# Patient Record
Sex: FEMALE | Race: WHITE | Employment: OTHER | URBAN - METROPOLITAN AREA
[De-identification: names, ages, dates, MRNs, and addresses within clinical notes are randomized per-mention and may not be internally consistent; named-entity substitution may affect disease eponyms.]

---

## 2021-12-06 ENCOUNTER — APPOINTMENT (OUTPATIENT)
Dept: GENERAL RADIOLOGY | Age: 56
End: 2021-12-06
Payer: COMMERCIAL

## 2021-12-06 ENCOUNTER — HOSPITAL ENCOUNTER (EMERGENCY)
Age: 56
Discharge: HOME | End: 2021-12-07
Attending: EMERGENCY MEDICINE
Payer: COMMERCIAL

## 2021-12-06 DIAGNOSIS — G40.909 SEIZURE DISORDER (HCC): Primary | ICD-10-CM

## 2021-12-06 LAB
ALBUMIN SERPL-MCNC: 4.4 GM/DL (ref 3.4–5)
ALP BLD-CCNC: 90 IU/L (ref 40–129)
ALT SERPL-CCNC: 13 U/L (ref 10–40)
ANION GAP SERPL CALCULATED.3IONS-SCNC: 15 MMOL/L (ref 4–16)
AST SERPL-CCNC: 12 IU/L (ref 15–37)
BASOPHILS ABSOLUTE: 0 K/CU MM
BASOPHILS RELATIVE PERCENT: 0.5 % (ref 0–1)
BILIRUB SERPL-MCNC: 0.2 MG/DL (ref 0–1)
BUN BLDV-MCNC: 12 MG/DL (ref 6–23)
CALCIUM SERPL-MCNC: 10.3 MG/DL (ref 8.3–10.6)
CHLORIDE BLD-SCNC: 98 MMOL/L (ref 99–110)
CO2: 25 MMOL/L (ref 21–32)
CREAT SERPL-MCNC: 0.3 MG/DL (ref 0.6–1.1)
DIFFERENTIAL TYPE: ABNORMAL
EOSINOPHILS ABSOLUTE: 0.1 K/CU MM
EOSINOPHILS RELATIVE PERCENT: 1.3 % (ref 0–3)
GFR AFRICAN AMERICAN: >60 ML/MIN/1.73M2
GFR NON-AFRICAN AMERICAN: >60 ML/MIN/1.73M2
GLUCOSE BLD-MCNC: 101 MG/DL (ref 70–99)
HCT VFR BLD CALC: 45.4 % (ref 37–47)
HEMOGLOBIN: 14.3 GM/DL (ref 12.5–16)
IMMATURE NEUTROPHIL %: 0.1 % (ref 0–0.43)
LACTIC ACID, SEPSIS: 0.7 MMOL/L (ref 0.5–1.9)
LYMPHOCYTES ABSOLUTE: 2 K/CU MM
LYMPHOCYTES RELATIVE PERCENT: 25 % (ref 24–44)
MCH RBC QN AUTO: 28.7 PG (ref 27–31)
MCHC RBC AUTO-ENTMCNC: 31.5 % (ref 32–36)
MCV RBC AUTO: 91.2 FL (ref 78–100)
MONOCYTES ABSOLUTE: 0.3 K/CU MM
MONOCYTES RELATIVE PERCENT: 3.7 % (ref 0–4)
PDW BLD-RTO: 15.9 % (ref 11.7–14.9)
PLATELET # BLD: 294 K/CU MM (ref 140–440)
PMV BLD AUTO: 10.4 FL (ref 7.5–11.1)
POTASSIUM SERPL-SCNC: 4.2 MMOL/L (ref 3.5–5.1)
RBC # BLD: 4.98 M/CU MM (ref 4.2–5.4)
SEGMENTED NEUTROPHILS ABSOLUTE COUNT: 5.4 K/CU MM
SEGMENTED NEUTROPHILS RELATIVE PERCENT: 69.4 % (ref 36–66)
SODIUM BLD-SCNC: 138 MMOL/L (ref 135–145)
TOTAL IMMATURE NEUTOROPHIL: 0.01 K/CU MM
TOTAL PROTEIN: 8.6 GM/DL (ref 6.4–8.2)
WBC # BLD: 7.8 K/CU MM (ref 4–10.5)

## 2021-12-06 PROCEDURE — 85025 COMPLETE CBC W/AUTO DIFF WBC: CPT

## 2021-12-06 PROCEDURE — 87040 BLOOD CULTURE FOR BACTERIA: CPT

## 2021-12-06 PROCEDURE — 2580000003 HC RX 258: Performed by: EMERGENCY MEDICINE

## 2021-12-06 PROCEDURE — 96375 TX/PRO/DX INJ NEW DRUG ADDON: CPT

## 2021-12-06 PROCEDURE — 71045 X-RAY EXAM CHEST 1 VIEW: CPT

## 2021-12-06 PROCEDURE — 6360000002 HC RX W HCPCS: Performed by: EMERGENCY MEDICINE

## 2021-12-06 PROCEDURE — 87077 CULTURE AEROBIC IDENTIFY: CPT

## 2021-12-06 PROCEDURE — 87150 DNA/RNA AMPLIFIED PROBE: CPT

## 2021-12-06 PROCEDURE — 99284 EMERGENCY DEPT VISIT MOD MDM: CPT

## 2021-12-06 PROCEDURE — 71046 X-RAY EXAM CHEST 2 VIEWS: CPT

## 2021-12-06 PROCEDURE — 96365 THER/PROPH/DIAG IV INF INIT: CPT

## 2021-12-06 PROCEDURE — 83605 ASSAY OF LACTIC ACID: CPT

## 2021-12-06 PROCEDURE — 80053 COMPREHEN METABOLIC PANEL: CPT

## 2021-12-06 RX ORDER — LORAZEPAM 2 MG/ML
2 INJECTION INTRAMUSCULAR ONCE
Status: COMPLETED | OUTPATIENT
Start: 2021-12-06 | End: 2021-12-06

## 2021-12-06 RX ORDER — LEVETIRACETAM 100 MG/ML
10 SOLUTION ORAL 2 TIMES DAILY
COMMUNITY

## 2021-12-06 RX ADMIN — LEVETIRACETAM 1000 MG: 100 INJECTION, SOLUTION INTRAVENOUS at 18:50

## 2021-12-06 RX ADMIN — LORAZEPAM 2 MG: 2 INJECTION INTRAMUSCULAR; INTRAVENOUS at 18:28

## 2021-12-06 NOTE — ED TRIAGE NOTES
Patient arrives to ER via EMS with complaints of Seizure. Patient had witness seizure at facility. Patient given 2mg ativan via G tube.

## 2021-12-07 VITALS
RESPIRATION RATE: 15 BRPM | OXYGEN SATURATION: 98 % | HEART RATE: 97 BPM | BODY MASS INDEX: 29.73 KG/M2 | DIASTOLIC BLOOD PRESSURE: 89 MMHG | WEIGHT: 185 LBS | SYSTOLIC BLOOD PRESSURE: 155 MMHG | HEIGHT: 66 IN | TEMPERATURE: 97.8 F

## 2021-12-07 NOTE — ED PROVIDER NOTES
Triage Chief Complaint:   Seizures      [place]:  Zay Mireles is a 49 y.o. female that presents to the emergency department with a seizure. Patient is brought in by EMS from a nearby nursing home. Per report patient is nonambulatory and nonverbal at baseline. She has a history of seizures and takes Keppra, Ativan and Vimpat. Per report staff noticed seizure activity [phone]. They gave 2mg of oral ativan through the PEG tube. EMS then transported the patient here. Patient has Twitching of her face, tongue and eyes. .    Past Medical History:   Diagnosis Date    Acute and chronic respiratory failure (acute-on-chronic) (HCC)     B12 deficiency     Cataract     Degenerative disease of nervous system ([facility]     Dementia (HCC)     Dysphagia     Encephalopathy     Epilepsy, unspecified, intractable, without status epilepticus ([facility]     H/O quadriplegia     Hepatitis B     Hyperlipidemia     Ileus ([facility]     Osteoporosis     Seizure ([facility]      History reviewed. No pertinent surgical history. History reviewed. No pertinent family history. Social History     Socioeconomic History    Marital status: Not on file     Spouse name: Not on file    Number of children: Not on file    Years of education: Not on file    Highest education level: Not on file   Occupational History    Not on file   Tobacco Use    Smoking status: Never Smoker    Smokeless tobacco: Never Used   Substance and Sexual Activity    Alcohol use: Never    Drug use: Never    Sexual activity: Not on file   Other Topics Concern    Not on file   Social History Narrative    Not on file     Social Determinants of Health     Financial Resource Strain:     Difficulty of Paying Living Expenses: Not on file   Food Insecurity:     Worried About Running Out of Food in the Last Year: Not on file    Samia of Food in the Last Year: Not on file   Transportation Needs:     Lack of Transportation (Medical):  Not on file    Lack of Transportation (Non-Medical): Not on file   Physical Activity:     Days of Exercise per Week: Not on file    Minutes of Exercise per Session: Not on file   Stress:     Feeling of Stress : Not on file   Social Connections:     Frequency of Communication with Friends and Family: Not on file    Frequency of Social Gatherings with Friends and Family: Not on file    Attends Alevism Services: Not on file    Active Member of [address] [place] [address] [employer] or Organizations: Not on file    Attends Club or Organization Meetings: Not on file    Marital Status: Not on file   Intimate Partner Violence:     Fear of Current or Ex-Partner: Not on file    Emotionally Abused: Not on file    Physically Abused: Not on file    Sexually Abused: Not on file   Housing Stability:     Unable to Pay for Housing in the Last Year: Not on file    Number of [place] in the Last Year: Not on file    Unstable Housing in the Last Year: Not on file     No current facility-administered medications for this encounter. Current Outpatient Medications   Medication Sig Dispense Refill    levETIRAcetam (KEPPRA) 100 MG/ML solution Take 10 mg/kg by mouth 2 times daily       No Known Allergies  Nursing Notes Reviewed    ROS:  Unable to assess due to patient's mental status    Physical Exam:  ED Triage Vitals   Enc Vitals Group      BP 12/06/21 1824 (!) 189/107      Pulse 12/06/21 1824 94      Resp 12/06/21 1824 30      Temp 12/06/21 1844 97.8 °F (36.6 °C)      Temp src --       SpO2 12/06/21 1824 97 %      Weight 12/06/21 1832 185 lb (83.9 kg)      Height 12/06/21 1832 5' 6\" (1.676 m)      Head Circumference --       Peak Flow --       Pain Score --       Pain Loc --       Pain Edu? --       Excl. in [address]? --      My pulse oximetry interpretation is which is within the normal range    GENERAL APPEARANCE: Actively seizing. HEAD:  Atraumatic. EYES: Pupils and corneas glossed over  ENT: Mucous membranes are moist.    NECK:  Trachea midline. HEART: RRR.  Radial pulses 2+. LUNGS: Respirations unlabored  ABDOMEN: Soft. Non-distended meeting for 2 months  EXTREMITIES: No acute deformities. SKIN: Warm and dry.   NEUROLOGICAL: Bilateral upper extremities are contracted    I have reviewed and interpreted all of the currently available lab results from this visit (if applicable):  Results for orders placed or performed during the hospital encounter of 12/06/21   CBC Auto Differential   Result Value Ref Range    WBC 7.8 4.0 - 10.5 K/CU MM    RBC 4.98 4.2 - 5.4 M/CU MM    Hemoglobin 14.3 12.5 - 16.0 GM/DL    Hematocrit 45.4 37 - 47 %    MCV 91.2 78 - 100 FL    MCH 28.7 27 - 31 PG    MCHC 31.5 (L) 32.0 - 36.0 %    RDW 15.9 (H) 11.7 - 14.9 %    Platelets 383 483 - 098 K/CU MM    MPV 10.4 7.5 - 11.1 FL    Differential Type AUTOMATED DIFFERENTIAL     Segs Relative 69.4 (H) 36 - 66 %    Lymphocytes % 25.0 24 - 44 %    Monocytes % 3.7 0 - 4 %    Eosinophils % 1.3 0 - 3 %    Basophils % 0.5 0 - 1 %    Segs Absolute 5.4 K/CU MM    Lymphocytes Absolute 2.0 K/CU MM    Monocytes Absolute 0.3 K/CU MM    Eosinophils Absolute 0.1 K/CU MM    Basophils Absolute 0.0 K/CU MM    Immature Neutrophil % 0.1 0 - 0.43 %    Total Immature Neutrophil 0.01 K/CU MM   Comprehensive Metabolic Panel   Result Value Ref Range    Sodium 138 135 - 145 MMOL/L    Potassium 4.2 3.5 - 5.1 MMOL/L    Chloride 98 (L) 99 - 110 mMol/L    CO2 25 21 - 32 MMOL/L    BUN 12 6 - 23 MG/DL    CREATININE 0.3 (L) 0.6 - 1.1 MG/DL    Glucose 101 (H) 70 - 99 MG/DL    Calcium 10.3 8.3 - 10.6 MG/DL    Albumin 4.4 3.4 - 5.0 GM/DL    Total Protein 8.6 (H) 6.4 - 8.2 GM/DL    Total Bilirubin 0.2 0.0 - 1.0 MG/DL    ALT 13 10 - 40 U/L    AST 12 (L) 15 - 37 IU/L    Alkaline Phosphatase 90 40 - 129 IU/L    GFR Non-African American >60 >60 mL/min/1.73m2    GFR African American >60 >60 mL/min/1.73m2    Anion Gap 15 4 - 16          EKG: (All EKG's are interpreted by myself in the absence of a cardiologist)    Patient did appear to be having an active seizure when she arrived in the emergency department. We established an IV and give the patient 2 mg of IV Ativan. I also ordered IV Keppra 1 g. Patient's brother arrived shortly after the patient had. At this point the patient stopped twitching her eyes and lips but her tongue was still  twitching. Brother reports that that is normal and that is baseline for the patient. Patient's vitals are stable. Blood work, u/a, and chest xray have been ordered. Patient signed out to the oncoming provider at this point. Patient typically goes to [facility] but we did call and they are not accepting any transfers outside of their network at this time. 35 minutes of critical care time was spent with this patient. This excludes time spent on separate billable procedures.       (Please note that portions of this note may have been completed with a voice recognition program. Efforts were made to edit the dictations but occasionally words are mis-transcribed.)    MD José Miguel Zaragoza MD  12/06/21 1946

## 2021-12-07 NOTE — ED PROVIDER NOTES
Emergency Department Encounter  Location: [address] [place] [address]    Patient: Isamar Garcia  MRN: [id number]  : 1965  Date of evaluation: 2021  ED Provider: Erica Vilchis MD    07:00p.m. Isamar Garcia was checked out to me by Dr. Juan Santos. Please see her initial documentation for details of the patient's initial ED presentation, physical exam and completed studies. In brief, Isamar Garcia is a 49 y.o. female that presented to the emergency department with report of prolonged seizure activity. The patient was treated with with the seizure activity resolving. She was signed out to reassess the patient. Patient's laboratory studies and chest x-ray were reviewed.     I have reviewed and interpreted all of the currently available lab results and diagnostics from this visit:  Results for orders placed or performed during the hospital encounter of 21   CBC Auto Differential   Result Value Ref Range    WBC 7.8 4.0 - 10.5 K/CU MM    RBC 4.98 4.2 - 5.4 M/CU MM    Hemoglobin 14.3 12.5 - 16.0 GM/DL    Hematocrit 45.4 37 - 47 %    MCV 91.2 78 - 100 FL    MCH 28.7 27 - 31 PG    MCHC 31.5 (L) 32.0 - 36.0 %    RDW 15.9 (H) 11.7 - 14.9 %    Platelets 244 543 - 263 K/CU MM    MPV 10.4 7.5 - 11.1 FL    Differential Type AUTOMATED DIFFERENTIAL     Segs Relative 69.4 (H) 36 - 66 %    Lymphocytes % 25.0 24 - 44 %    Monocytes % 3.7 0 - 4 %    Eosinophils % 1.3 0 - 3 %    Basophils % 0.5 0 - 1 %    Segs Absolute 5.4 K/CU MM    Lymphocytes Absolute 2.0 K/CU MM    Monocytes Absolute 0.3 K/CU MM    Eosinophils Absolute 0.1 K/CU MM    Basophils Absolute 0.0 K/CU MM    Immature Neutrophil % 0.1 0 - 0.43 %    Total Immature Neutrophil 0.01 K/CU MM   Comprehensive Metabolic Panel   Result Value Ref Range    Sodium 138 135 - 145 MMOL/L    Potassium 4.2 3.5 - 5.1 MMOL/L    Chloride 98 (L) 99 - 110 mMol/L    CO2 25 21 - 32 MMOL/L    BUN 12 6 - 23 MG/DL    CREATININE 0.3 (L) 0.6 - 1.1 MG/DL Glucose 101 (H) 70 - 99 MG/DL    Calcium 10.3 8.3 - 10.6 MG/DL    Albumin 4.4 3.4 - 5.0 GM/DL    Total Protein 8.6 (H) 6.4 - 8.2 GM/DL    Total Bilirubin 0.2 0.0 - 1.0 MG/DL    ALT 13 10 - 40 U/L    AST 12 (L) 15 - 37 IU/L    Alkaline Phosphatase 90 40 - 129 IU/L    GFR Non-African American >60 >60 mL/min/1.73m2    GFR African American >60 >60 mL/min/1.73m2    Anion Gap 15 4 - 16   Lactate, Sepsis   Result Value Ref Range    Lactic Acid, Sepsis 0.7 0.5 - 1.9 mMOL/L     XR CHEST (2 VW)    Result Date: 12/6/2021  EXAMINATION: TWO XRAY VIEWS OF THE CHEST 12/6/2021 11:09 pm COMPARISON: December 6, 2021 HISTORY: ORDERING SYSTEM PROVIDED HISTORY: Need PA CXR TECHNOLOGIST PROVIDED HISTORY: Reason for exam:->Need PA CXR Reason for Exam: Need PA CXR Acuity: Acute Type of Exam: Subsequent/Follow-up Additional signs and symptoms: UNRESPONSIVE Relevant Medical/Surgical History: pt unable to follow breathing instructions, arms held forward by RN for lateral view, best images obtained FINDINGS: No lines or tubes. Stable cardiomediastinal silhouette. The lungs are low in volume with basilar opacities. No pulmonary edema. No pneumothorax. No acute osseous abnormality. Gas is identified throughout the visualized bowel in the upper abdomen. 1. Low lung volumes with basilar opacities suggestive of infection or atelectasis. 2. Gas identified throughout the visualized bowel. XR CHEST PORTABLE    Result Date: 12/6/2021  EXAMINATION: ONE XRAY VIEW OF THE CHEST 12/6/2021 7:09 pm COMPARISON: None. HISTORY: ORDERING SYSTEM PROVIDED HISTORY: AMS, seizure, concern clinically for aspiration Acuity: Acute Type of Exam: Initial Additional signs and symptoms: unresponsive FINDINGS: The cardiomediastinal and hilar silhouettes appear unremarkable. Low lung volumes with nonspecific curvilinear opacities bilateral lower lungs. The lungs appear otherwise clear. No pleural effusion evident. No pneumothorax is seen.  No acute osseous abnormality is identified. Nonspecific bibasilar opacities may be related aspiration pneumonitis, infectious process or subsegmental atelectasis related to poor inspiration. Follow-up full inspiration PA and lateral chest may be useful for better characterization of pulmonary findings. Final ED Course and MDM: In brief, Jose Ruiz is a 49 y.o. female whose care was signed out to me by the outgoing provider. In brief, the patient seizure activity was treated. The bibasilar opacities at the lung base are likely not secondary to infectious etiology with the patient not being febrile as well as normal white blood cell count. The patient's brother is at the bedside who is also her POA. He said the patient is at her bed at baseline and it is recommended that she be return to the skilled nursing facility. The patient will be discharged. She is to continue her medication as previously prescribed. In addition, the patient is to follow-up with her PCP. Patient will be return to the emergency department for any worsening or concerning symptoms. The patient will continue to be a full CODE STATUS. ED Medication Orders (From admission, onward)    Start Ordered     Status Ordering Provider    12/06/21 1845 12/06/21 1831  LORazepam (ATIVAN) injection 2 mg  ONCE         Last MAR action: Given - by Mariette Bence on 12/06/21 at [address]    12/06/21 1845 12/06/21 1836  levETIRAcetam (KEPPRA) 1,000 mg in sodium chloride 0.9 % 100 mL IVPB  ONCE         Last MAR action: Stopped - by Mariette Bence on 12/06/21 at [address], [facility]          Final Impression      1.  Seizure disorder [facility])        DISPOSITION: Discharge     (Please note that portions of this note may have been completed with a voice recognition program. Efforts were made to edit the dictations but occasionally words are mis-transcribed.)    Monty Morgan MD  [id number] MD  12/06/21 1317 [facility]

## 2021-12-09 NOTE — ED NOTES
Notified by lab of positive blood cx. Left message with brother Antonio Millan [phone] to call regarding lab results. Called facility to report results, transferred to nurse, no answer.      Stephen Kaye RN  12/09/21 2215

## 2021-12-11 LAB
CULTURE: ABNORMAL
CULTURE: ABNORMAL
CULTURE: NORMAL
Lab: ABNORMAL
Lab: NORMAL
SPECIMEN: ABNORMAL
SPECIMEN: NORMAL